# Patient Record
Sex: MALE | Race: WHITE
[De-identification: names, ages, dates, MRNs, and addresses within clinical notes are randomized per-mention and may not be internally consistent; named-entity substitution may affect disease eponyms.]

---

## 2018-11-30 ENCOUNTER — HOSPITAL ENCOUNTER (EMERGENCY)
Dept: HOSPITAL 42 - ED | Age: 38
Discharge: HOME | End: 2018-11-30
Payer: MEDICAID

## 2018-11-30 VITALS — TEMPERATURE: 98 F | OXYGEN SATURATION: 100 % | RESPIRATION RATE: 18 BRPM

## 2018-11-30 VITALS — SYSTOLIC BLOOD PRESSURE: 116 MMHG | DIASTOLIC BLOOD PRESSURE: 85 MMHG | HEART RATE: 75 BPM

## 2018-11-30 VITALS — BODY MASS INDEX: 29.1 KG/M2

## 2018-11-30 DIAGNOSIS — J45.909: Primary | ICD-10-CM

## 2018-11-30 NOTE — ED PDOC
Arrival/HPI





- General


Historian: Patient





- History of Present Illness


Narrative History of Present Illness (Text): 





11/30/18 16:56


39 y/o male, pmh including asthma, nkda, c/o feeling asthma exacerbation x 1 

hour.  Pt. stated that he was at work, another worker cleaning carpet and dust 

is flying around, making him coughing and feel like asthma exacerbation and 

called the ambulance, no chest pain or shortness of breath, no night sweat, no 

dizziness, no change in vision, no other medical or psychological complaints. 





Past Medical History





- Provider Review


Nursing Documentation Reviewed: Yes





Family/Social History





- Physician Review


Nursing Documentation Reviewed: Yes


Family/Social History: Unknown Family HX





Allergies/Home Meds


Allergies/Adverse Reactions: 


Allergies





No Known Allergies Allergy (Verified 11/30/18 16:57)


   











Review of Systems





- Review of Systems


Constitutional: absent: Fatigue, Fevers


Eyes: absent: Vision Changes


ENT: absent: Hearing Changes


Respiratory: Cough, Wheezing.  absent: SOB, Sputum


Cardiovascular: absent: Chest Pain


Gastrointestinal: absent: Abdominal Pain, Nausea, Vomiting


Musculoskeletal: absent: Arthralgias, Back Pain


Skin: absent: Rash


Neurological: absent: Headache, Dizziness


Psychiatric: absent: Anxiety, Depression, Suicidal Ideation





Physical Exam


Vital Signs Reviewed: Yes





Vital Signs











  Temp Pulse Resp BP Pulse Ox


 


 11/30/18 16:55  98.0 F  78  18  118/92 H  100











Temperature: Afebrile


Blood Pressure: Hypertensive


Pulse: Regular


Respiratory Rate: Normal


Appearance: Positive for: Well-Appearing, Non-Toxic, Comfortable


Pain Distress: None


Mental Status: Positive for: Alert and Oriented X 3





- Systems Exam


Head: Present: Atraumatic, Normocephalic


Pupils: Present: PERRL


Extroacular Muscles: Present: EOMI


Conjunctiva: Present: Normal


Mouth: Present: Moist Mucous Membranes


Neck: Present: Normal Range of Motion


Respiratory/Chest: Present: Clear to Auscultation, Good Air Exchange, Wheezes 

(very mild).  No: Respiratory Distress, Accessory Muscle Use, Decreased Breath 

Sounds, Rales, Retracting, Rhonchi, Tachypneic, Tender to Palpation


Cardiovascular: Present: Regular Rate and Rhythm, Normal S1, S2.  No: Murmurs


Abdomen: No: Tenderness, Distention, Peritoneal Signs


Back: Present: Normal Inspection


Upper Extremity: Present: Normal Inspection.  No: Cyanosis, Edema


Lower Extremity: Present: Normal Inspection.  No: Edema


Neurological: Present: GCS=15, CN II-XII Intact, Speech Normal


Skin: Present: Warm, Dry, Normal Color.  No: Rashes


Psychiatric: Present: Alert, Oriented x 3, Normal Insight, Normal Concentration





Medical Decision Making


ED Course and Treatment: 





11/30/18 16:58


-duoneb x 1 





11/30/18 17:16


-wheezing resolved, feeling much better, asymptomatic, will discharge home.


-Pt. walking around with room air saturation above 98%, no indication of steroid

at this time. 


-Discharge home with claritin, albuterol MDI, avoid contact with possible 

allergen, follow up with your own pmd within 2 days, return to the ER for any 

new or worsening signs or symptoms. 





- PA / NP / Resident Statement


MD/DO has reviewed & agrees with the documentation as recorded.





Disposition/Present on Arrival





- Present on Arrival


Any Indicators Present on Arrival: No


History of DVT/PE: No


History of Uncontrolled Diabetes: No


Urinary Catheter: No


History of Decub. Ulcer: No





- Disposition


Have Diagnosis and Disposition been Completed?: Yes


Diagnosis: 


 Asthma due to environmental allergies





Disposition: HOME/ ROUTINE


Disposition Time: 17:17


Patient Plan: Discharge


Condition: IMPROVED


Additional Instructions: 


-Discharge home with claritin, albuterol MDI, avoid contact with possible 

allergen, follow up with your own pmd within 2 days, return to the ER for any 

new or worsening signs or symptoms. 


Prescriptions: 


Albuterol HFA [Ventolin HFA 90 mcg/actuation (8 g)] 2 puff IH [id number] PRN #1 

inhaler


 PRN Reason: Other


Loratadine [Claritin] 10 mg PO DAILY PRN #10 tab


 PRN Reason: Other


Referrals: 


[facility] [place] up with primary


Forms:  WORK NOTE